# Patient Record
Sex: MALE | Race: WHITE | NOT HISPANIC OR LATINO | URBAN - METROPOLITAN AREA
[De-identification: names, ages, dates, MRNs, and addresses within clinical notes are randomized per-mention and may not be internally consistent; named-entity substitution may affect disease eponyms.]

---

## 2018-02-20 ENCOUNTER — OUTPATIENT (OUTPATIENT)
Dept: OUTPATIENT SERVICES | Facility: HOSPITAL | Age: 43
LOS: 1 days | Discharge: HOME | End: 2018-02-20

## 2018-03-06 DIAGNOSIS — K02.63 DENTAL CARIES ON SMOOTH SURFACE PENETRATING INTO PULP: ICD-10-CM

## 2018-05-03 ENCOUNTER — EMERGENCY (EMERGENCY)
Facility: HOSPITAL | Age: 43
LOS: 0 days | Discharge: HOME | End: 2018-05-03
Attending: EMERGENCY MEDICINE | Admitting: EMERGENCY MEDICINE

## 2018-05-03 VITALS
SYSTOLIC BLOOD PRESSURE: 124 MMHG | RESPIRATION RATE: 20 BRPM | DIASTOLIC BLOOD PRESSURE: 70 MMHG | OXYGEN SATURATION: 99 % | HEART RATE: 103 BPM | TEMPERATURE: 98 F

## 2018-05-03 DIAGNOSIS — R07.89 OTHER CHEST PAIN: ICD-10-CM

## 2018-05-03 DIAGNOSIS — H93.13 TINNITUS, BILATERAL: ICD-10-CM

## 2018-05-03 LAB
ANION GAP SERPL CALC-SCNC: 10 MMOL/L — SIGNIFICANT CHANGE UP (ref 7–14)
BASOPHILS # BLD AUTO: 0.06 K/UL — SIGNIFICANT CHANGE UP (ref 0–0.2)
BASOPHILS NFR BLD AUTO: 0.4 % — SIGNIFICANT CHANGE UP (ref 0–1)
BUN SERPL-MCNC: 17 MG/DL — SIGNIFICANT CHANGE UP (ref 10–20)
CALCIUM SERPL-MCNC: 9.1 MG/DL — SIGNIFICANT CHANGE UP (ref 8.5–10.1)
CHLORIDE SERPL-SCNC: 103 MMOL/L — SIGNIFICANT CHANGE UP (ref 98–110)
CK MB CFR SERPL CALC: <0.1 NG/ML — LOW (ref 0.6–6.3)
CO2 SERPL-SCNC: 31 MMOL/L — SIGNIFICANT CHANGE UP (ref 17–32)
CREAT SERPL-MCNC: 0.7 MG/DL — SIGNIFICANT CHANGE UP (ref 0.7–1.5)
EOSINOPHIL # BLD AUTO: 0.16 K/UL — SIGNIFICANT CHANGE UP (ref 0–0.7)
EOSINOPHIL NFR BLD AUTO: 1.1 % — SIGNIFICANT CHANGE UP (ref 0–8)
GLUCOSE SERPL-MCNC: 88 MG/DL — SIGNIFICANT CHANGE UP (ref 70–99)
HCT VFR BLD CALC: 42.6 % — SIGNIFICANT CHANGE UP (ref 42–52)
HGB BLD-MCNC: 14.3 G/DL — SIGNIFICANT CHANGE UP (ref 14–18)
IMM GRANULOCYTES NFR BLD AUTO: 0.4 % — HIGH (ref 0.1–0.3)
LYMPHOCYTES # BLD AUTO: 21.6 % — SIGNIFICANT CHANGE UP (ref 20.5–51.1)
LYMPHOCYTES # BLD AUTO: 3.12 K/UL — SIGNIFICANT CHANGE UP (ref 1.2–3.4)
MCHC RBC-ENTMCNC: 30.1 PG — SIGNIFICANT CHANGE UP (ref 27–31)
MCHC RBC-ENTMCNC: 33.6 G/DL — SIGNIFICANT CHANGE UP (ref 32–37)
MCV RBC AUTO: 89.7 FL — SIGNIFICANT CHANGE UP (ref 80–94)
MONOCYTES # BLD AUTO: 0.73 K/UL — HIGH (ref 0.1–0.6)
MONOCYTES NFR BLD AUTO: 5.1 % — SIGNIFICANT CHANGE UP (ref 1.7–9.3)
NEUTROPHILS # BLD AUTO: 10.3 K/UL — HIGH (ref 1.4–6.5)
NEUTROPHILS NFR BLD AUTO: 71.4 % — SIGNIFICANT CHANGE UP (ref 42.2–75.2)
PLATELET # BLD AUTO: 331 K/UL — SIGNIFICANT CHANGE UP (ref 130–400)
POTASSIUM SERPL-MCNC: 4.1 MMOL/L — SIGNIFICANT CHANGE UP (ref 3.5–5)
POTASSIUM SERPL-SCNC: 4.1 MMOL/L — SIGNIFICANT CHANGE UP (ref 3.5–5)
RBC # BLD: 4.75 M/UL — SIGNIFICANT CHANGE UP (ref 4.7–6.1)
RBC # FLD: 12.6 % — SIGNIFICANT CHANGE UP (ref 11.5–14.5)
SODIUM SERPL-SCNC: 144 MMOL/L — SIGNIFICANT CHANGE UP (ref 135–146)
TROPONIN T SERPL-MCNC: <0.01 NG/ML — SIGNIFICANT CHANGE UP
WBC # BLD: 14.43 K/UL — HIGH (ref 4.8–10.8)
WBC # FLD AUTO: 14.43 K/UL — HIGH (ref 4.8–10.8)

## 2018-05-03 NOTE — ED PROVIDER NOTE - OBJECTIVE STATEMENT
ASL : Valentine Calzada  43yo M with PMHx deafness, ?brain aneurysm p/w buzzing/ringing sensation in his head x3 days. Also has similar sensation in his upper chest and back intermittently. Denies fever, chills, headache, lightheadedness, CP, SOB, cough, palpitations, nausea, vomiting, diarrhea, abd pain, dysuria, hematuria, leg swelling. No numbness, weakness, tingling. No vision changes.

## 2018-05-03 NOTE — ED ADULT TRIAGE NOTE - CHIEF COMPLAINT QUOTE
pt complains of chest pain and headache for 1.5 weeks; patient is coming from [facility] urgent care, pt complains of chest pain and headache for 1.5 weeks; patient is coming from [facility] urgent care, patient is deaf  paged by  pt complains of chest pain and ringing in his head for 3 days patient is coming from [facility] urgent care, patient is deaf  Valentine Botello came to interpret

## 2018-05-03 NOTE — ED ADULT NURSE NOTE - CHIEF COMPLAINT QUOTE
pt complains of chest pain and ringing in his head for 3 days patient is coming from [facility] urgent care, patient is deaf  Valentine Botello came to interpret

## 2018-05-03 NOTE — ED PROVIDER NOTE - ATTENDING CONTRIBUTION TO CARE
42 y M PMH deafness, brain aneurysm found on previous MRI, did not follow up for further workup or intervention, pw a buzzing sensation in his head x 3 days, also intermittently in his upper left or upper right chest or upper back, at level approx of clavicles. No headache, nausea, vomiting, diarrhea, cough, neck pain, recent travel, numbness, tingling, weakness, vision changes.   Exam: NAD, NCAT, HEENT: no OP erythema or swelling of oral structures, airway patent, EOMI, PERRLA 4mm, Neck: supple, nontender, nl ROM, Heart: RRR, no murmur, Lungs: BCTA, no signs of increased WOB, Abd: NTND, no guarding or rebound, no hernia palpated, MSK: chest, back, and ext nontender, nl rom, no deformity. Neuro: A&Ox3, CN II-XII intact, normal strength 5/5 all 4 ext, nl sensation throughout, normal signing and coordination, normal gait.  A/P: eval for intracranial pathology, low likelihood ACS. Labs, fluids, imaging, reassess. ASL : Valentine Calzada. 42 y M PMH deafness, brain aneurysm found on previous MRI, did not follow up for further workup or intervention, pw a buzzing sensation in his head x 3 days, also intermittently in his upper left or upper right chest or upper back, at level approx of clavicles. No headache, nausea, vomiting, diarrhea, cough, neck pain, recent travel, numbness, tingling, weakness, vision changes.   Exam: NAD, NCAT, HEENT: no OP erythema or swelling of oral structures, airway patent, EOMI, PERRLA 4mm, Neck: supple, nontender, nl ROM, Heart: RRR, no murmur, Lungs: BCTA, no signs of increased WOB, Abd: NTND, no guarding or rebound, no hernia palpated, MSK: chest, back, and ext nontender, nl rom, no deformity. Neuro: A&Ox3, CN II-XII intact, normal strength 5/5 all 4 ext, nl sensation throughout, normal signing and coordination, normal gait.  A/P: eval for intracranial pathology, low likelihood ACS. Labs, fluids, imaging, reassess.

## 2018-05-03 NOTE — ED PROVIDER NOTE - PROGRESS NOTE DETAILS
Spoke with radiology to obtain record of MR head/temporal from 2014. 1) No evidence of mass within the internal auditory canals or cerebellopontine angle cisterns. 2) Patulous appearance of the internal auditory canals bilaterally. 3) Focal soft tissue adjacent to the anterior communicating artery likely reflecting volume averaging with adjacent brain (ser 3 image 10). MRA may be obtained to exclude aneurysm. ASL translation provided by Valentine Calzada throughout ED stay

## 2018-05-03 NOTE — ED PROVIDER NOTE - PHYSICAL EXAMINATION
Vital signs reviewed  GENERAL: Patient well appearing, NAD  HEAD: NCAT  EYES: PERRL, EOMI  ENT: MMM.   NECK: Supple, non tender  RESPIRATORY: Normal respiratory effort. CTA B/L. No wheezing, rales, rhonchi  CARDIOVASCULAR: Regular rate and rhythm. Normal S1/S2. No murmurs, rubs or gallops.  ABDOMEN: Soft. Nondistended. Nontender. No guarding or rebound. No CVA tenderness.  MUSCULOSKELETAL/EXTREMITIES: Brisk cap refill. 2+ radial pulses. No leg edema.  SKIN:  Warm and dry. No acute rash.  NEURO: AAOx3. No gross FND.  PSYCHIATRIC: Cooperative. Affect appropriate

## 2018-05-03 NOTE — ED PROVIDER NOTE - NS ED ROS FT
Constitutional: No fever  Eyes:  No visual changes  ENMT:  +deaf. No neck pain  Cardiac:  No chest pain. See HPI  Respiratory:  No cough, SOB  GI:  No nausea, vomiting, diarrhea, abdominal pain.  :  No dysuria  MS:  No back pain.  Neuro: +buzzing sensation. See HPI. No headache or lightheadedness  Skin:  No skin rash  Endocrine: No history of thyroid disease or diabetes.  Except as documented in the HPI,  all other systems are negative.

## 2025-03-27 NOTE — ED ADULT NURSE NOTE - HARM RISK FACTORS
Airway    Performed by: Ed Barrett DO  Authorized by: Ed Barrett DO    Final Airway Type:  Endotracheal airway  Final Endotracheal Airway*:  ETT  Cuff*:  Regular  Technique Used for Successful ETT Placement:  Video laryngoscopy  Devices/Methods Used in Placement*:  Mask  Intubation Procedure*:  Preoxygenation, ETCO2, Atraumatic, Dentition Unchanged and Pharynx Clear  Insertion Site:  Oral  Blade Type*:  MAC  Blade Size*:  3  Placement Verified by: auscultation and capnometry    Glottic View*:  1 - full view of glottis  Attempts*:  1  Ventilation Between Attempts:  None  Number of Other Approaches Attempted:  0   Patient Identified, Procedure confirmed, Emergency equipment available and Safety protocols followed  Location:  OR  Urgency:  Elective  Difficult Airway: No    Indications for Airway Management:  Anesthesia  Spontaneous Ventilation: absent    Sedation Level:  Anesthetized  MILS Maintained Throughout: No    Mask Difficulty Assessment:  0 - not attempted  Start Time: 3/27/2025 6:33 AM      
no